# Patient Record
Sex: MALE | Race: WHITE | NOT HISPANIC OR LATINO | URBAN - METROPOLITAN AREA
[De-identification: names, ages, dates, MRNs, and addresses within clinical notes are randomized per-mention and may not be internally consistent; named-entity substitution may affect disease eponyms.]

---

## 2021-04-16 ENCOUNTER — TELEPHONE ENCOUNTER (OUTPATIENT)
Dept: URBAN - METROPOLITAN AREA CLINIC 74 | Facility: CLINIC | Age: 66
End: 2021-04-16

## 2021-04-16 ENCOUNTER — OFFICE VISIT (OUTPATIENT)
Dept: URBAN - METROPOLITAN AREA TELEHEALTH 2 | Facility: TELEHEALTH | Age: 66
End: 2021-04-16
Payer: COMMERCIAL

## 2021-04-16 DIAGNOSIS — Z12.11 ENCOUNTER FOR SCREENING FOR MALIGNANT NEOPLASM OF COLON: ICD-10-CM

## 2021-04-16 DIAGNOSIS — K21.9 GASTROESOPHAGEAL REFLUX DISEASE WITHOUT ESOPHAGITIS: ICD-10-CM

## 2021-04-16 DIAGNOSIS — Z91.89 OTHER SPECIFIED PERSONAL RISK FACTORS, NOT ELSEWHERE CLASSIFIED: ICD-10-CM

## 2021-04-16 DIAGNOSIS — Z12.12 ENCOUNTER FOR SCREENING FOR MALIGNANT NEOPLASM OF RECTUM: ICD-10-CM

## 2021-04-16 PROBLEM — 305058001: Status: ACTIVE | Noted: 2021-04-16

## 2021-04-16 PROBLEM — 281694009: Status: ACTIVE | Noted: 2021-04-16

## 2021-04-16 PROCEDURE — 99203 OFFICE O/P NEW LOW 30 MIN: CPT | Performed by: STUDENT IN AN ORGANIZED HEALTH CARE EDUCATION/TRAINING PROGRAM

## 2021-04-16 RX ORDER — TRAZODONE HYDROCHLORIDE 100 MG/1
1 TABLET AT BEDTIME TABLET, FILM COATED ORAL ONCE A DAY
Status: ACTIVE | COMMUNITY

## 2021-04-16 RX ORDER — ALPRAZOLAM 0.5 MG/1
1 TABLET TABLET ORAL TWICE A DAY
Status: ACTIVE | COMMUNITY

## 2021-04-16 NOTE — HPI-TODAY'S VISIT:
66-year-old male New to me Seen today for setting up his colonoscopy in the setting of family history of colon cancer. Patient states that his sister was diagnosed with colon cancer in her 60s.  Patient last colonoscopy was almost 7 to 8 years back. Patient overall denies any GI complaints.  Reports GERD but seems to be well controlled on daily PPI.  No dysphagia or odynophagia.  No nausea or vomiting.  No constipation or diarrhea.  No blood in the stool. No anticoagulation.

## 2021-06-24 ENCOUNTER — OFFICE VISIT (OUTPATIENT)
Dept: URBAN - METROPOLITAN AREA SURGERY CENTER 30 | Facility: SURGERY CENTER | Age: 66
End: 2021-06-24

## 2021-10-25 PROBLEM — 305058001: Status: ACTIVE | Noted: 2021-04-16

## 2021-12-16 ENCOUNTER — CLAIMS CREATED FROM THE CLAIM WINDOW (OUTPATIENT)
Dept: URBAN - METROPOLITAN AREA CLINIC 4 | Facility: CLINIC | Age: 66
End: 2021-12-16
Payer: COMMERCIAL

## 2021-12-16 ENCOUNTER — OFFICE VISIT (OUTPATIENT)
Dept: URBAN - METROPOLITAN AREA SURGERY CENTER 30 | Facility: SURGERY CENTER | Age: 66
End: 2021-12-16
Payer: COMMERCIAL

## 2021-12-16 DIAGNOSIS — D12.0 BENIGN NEOPLASM OF CECUM: ICD-10-CM

## 2021-12-16 DIAGNOSIS — D12.3 ADENOMA OF TRANSVERSE COLON: ICD-10-CM

## 2021-12-16 DIAGNOSIS — D12.0 ADENOMA OF CECUM: ICD-10-CM

## 2021-12-16 DIAGNOSIS — D12.3 BENIGN NEOPLASM OF TRANSVERSE COLON: ICD-10-CM

## 2021-12-16 DIAGNOSIS — Z80.0 BROTHER AT YOUNG AGE FAMILY HISTORY OF COLON CANCER: ICD-10-CM

## 2021-12-16 PROCEDURE — 88305 TISSUE EXAM BY PATHOLOGIST: CPT | Performed by: PATHOLOGY

## 2021-12-16 PROCEDURE — 45385 COLONOSCOPY W/LESION REMOVAL: CPT | Performed by: STUDENT IN AN ORGANIZED HEALTH CARE EDUCATION/TRAINING PROGRAM

## 2021-12-16 PROCEDURE — G8907 PT DOC NO EVENTS ON DISCHARG: HCPCS | Performed by: STUDENT IN AN ORGANIZED HEALTH CARE EDUCATION/TRAINING PROGRAM

## 2023-02-01 ENCOUNTER — WEB ENCOUNTER (OUTPATIENT)
Dept: URBAN - METROPOLITAN AREA CLINIC 74 | Facility: CLINIC | Age: 68
End: 2023-02-01

## 2023-02-01 ENCOUNTER — LAB OUTSIDE AN ENCOUNTER (OUTPATIENT)
Dept: URBAN - METROPOLITAN AREA CLINIC 74 | Facility: CLINIC | Age: 68
End: 2023-02-01

## 2023-02-01 ENCOUNTER — OFFICE VISIT (OUTPATIENT)
Dept: URBAN - METROPOLITAN AREA CLINIC 74 | Facility: CLINIC | Age: 68
End: 2023-02-01
Payer: COMMERCIAL

## 2023-02-01 VITALS
TEMPERATURE: 97.5 F | WEIGHT: 179 LBS | HEIGHT: 69 IN | SYSTOLIC BLOOD PRESSURE: 139 MMHG | BODY MASS INDEX: 26.51 KG/M2 | DIASTOLIC BLOOD PRESSURE: 74 MMHG | HEART RATE: 70 BPM

## 2023-02-01 DIAGNOSIS — K21.9 GASTROESOPHAGEAL REFLUX DISEASE WITHOUT ESOPHAGITIS: ICD-10-CM

## 2023-02-01 PROBLEM — 399432003: Status: ACTIVE | Noted: 2023-01-30

## 2023-02-01 PROBLEM — 449833002: Status: ACTIVE | Noted: 2023-01-30

## 2023-02-01 PROBLEM — 429430001: Status: ACTIVE | Noted: 2023-01-30

## 2023-02-01 PROBLEM — 249504006: Status: ACTIVE | Noted: 2023-02-01

## 2023-02-01 PROCEDURE — 99213 OFFICE O/P EST LOW 20 MIN: CPT | Performed by: INTERNAL MEDICINE

## 2023-02-01 NOTE — HPI-TODAY'S VISIT:
66-year-old male New to me Seen today for setting up his colonoscopy in the setting of family history of colon cancer. Patient states that his sister was diagnosed with colon cancer in her 60s.  Patient last colonoscopy was almost 7 to 8 years back. Patient overall denies any GI complaints.  Reports GERD but seems to be well controlled on daily PPI.  No dysphagia or odynophagia.  No nausea or vomiting.  No constipation or diarrhea.  No blood in the stool. No anticoagulation.   Today February 1, 2023 the patient returns for a follow-up visit, the patient was last seen in the office on April 16, 2021 for screening colonoscopy, gastroesophageal reflux, at the time of the visit the patient stated that the sister had colon cancer at age past 60.  The patient's gastroesophageal reflux was well controlled with PPIs but the patient requested to have an EGD, the patient was advised to follow lifestyle modification rather than have an EGD as explained by DR. Trevino.  At the time the patient denied having any dysphagia, odynophagia, there was no nausea vomiting.  The patient was having normal bowel movements with no rectal bleeding.  The patient had a colonoscopy on December 16, 2021 at that time the patient was found to have 3 sessile polyps which were removed from the hepatic flexure and cecum, the polyps measuring between 3 and 5 mm, there were removed with cold snare.  All polyps were adenomatous.  The patient had sigmoid diverticulosis and grade 2 internal hemorrhoids.  At that time the patient was advised to get a colonoscopy in 5 years.  Today the patient returns to an EGD.  The patient was seen at the urgent care and subsequently the emergency room with anterior chest pain, claimed that the pain came from the epigastric area and radiated towards the left chest.  This pain has been intermittent self-limited and possibly associated with gastroesophageal reflux.  The patient has taken PPIs for many years and is currently taking omeprazole 40 mg daily.  The patient denies having any dysphagia, odynophagia, there has been occasional nausea with no vomiting, there has been no hematemesis or melena.  No history of gallbladder disease or ulcers.  Currently the patient is having normal bowel movements.  The patient was recently started on medication for anxiety by primary care.  The patient will be scheduled to have an EGD.  Benefits potential complications and alternatives to EGD were disclosed.  The patient will follow antireflux measures and diet and will remain on omeprazole as prescribed.  The patient also complained of increased flatulence when ingesting vegetables, there is no evidence of lactose intolerance.  The patient has been instructed to follow a FODMAP diet.  The patient will return to the office after completion of testing.

## 2023-03-03 PROBLEM — 312824007: Status: ACTIVE | Noted: 2023-01-30

## 2023-03-03 PROBLEM — 428283002: Status: ACTIVE | Noted: 2023-03-03

## 2023-03-03 PROBLEM — 266435005: Status: ACTIVE | Noted: 2021-04-16

## 2023-03-21 ENCOUNTER — OFFICE VISIT (OUTPATIENT)
Dept: URBAN - METROPOLITAN AREA SURGERY CENTER 30 | Facility: SURGERY CENTER | Age: 68
End: 2023-03-21
Payer: COMMERCIAL

## 2023-03-21 ENCOUNTER — CLAIMS CREATED FROM THE CLAIM WINDOW (OUTPATIENT)
Dept: URBAN - METROPOLITAN AREA CLINIC 4 | Facility: CLINIC | Age: 68
End: 2023-03-21
Payer: COMMERCIAL

## 2023-03-21 DIAGNOSIS — K31.89 OTHER DISEASES OF STOMACH AND DUODENUM: ICD-10-CM

## 2023-03-21 DIAGNOSIS — K31.89 ACQUIRED DEFORMITY OF DUODENUM: ICD-10-CM

## 2023-03-21 DIAGNOSIS — K21.9 ACID REFLUX: ICD-10-CM

## 2023-03-21 DIAGNOSIS — K29.70 GASTRITIS, UNSPECIFIED, WITHOUT BLEEDING: ICD-10-CM

## 2023-03-21 PROCEDURE — 43239 EGD BIOPSY SINGLE/MULTIPLE: CPT | Performed by: INTERNAL MEDICINE

## 2023-03-21 PROCEDURE — 88312 SPECIAL STAINS GROUP 1: CPT | Performed by: PATHOLOGY

## 2023-03-21 PROCEDURE — G8907 PT DOC NO EVENTS ON DISCHARG: HCPCS | Performed by: INTERNAL MEDICINE

## 2023-03-21 PROCEDURE — 88305 TISSUE EXAM BY PATHOLOGIST: CPT | Performed by: PATHOLOGY

## 2023-03-23 PROBLEM — 196735001: Status: ACTIVE | Noted: 2023-03-23

## 2023-03-30 PROBLEM — 266433003: Status: ACTIVE | Noted: 2023-03-30

## 2023-04-21 ENCOUNTER — WEB ENCOUNTER (OUTPATIENT)
Dept: URBAN - METROPOLITAN AREA CLINIC 74 | Facility: CLINIC | Age: 68
End: 2023-04-21

## 2023-04-21 ENCOUNTER — OFFICE VISIT (OUTPATIENT)
Dept: URBAN - METROPOLITAN AREA CLINIC 74 | Facility: CLINIC | Age: 68
End: 2023-04-21
Payer: COMMERCIAL

## 2023-04-21 ENCOUNTER — DASHBOARD ENCOUNTERS (OUTPATIENT)
Age: 68
End: 2023-04-21

## 2023-04-21 VITALS
WEIGHT: 182.8 LBS | TEMPERATURE: 96.8 F | BODY MASS INDEX: 27.08 KG/M2 | OXYGEN SATURATION: 97 % | HEART RATE: 88 BPM | SYSTOLIC BLOOD PRESSURE: 126 MMHG | HEIGHT: 69 IN | DIASTOLIC BLOOD PRESSURE: 78 MMHG

## 2023-04-21 DIAGNOSIS — K29.30 CHRONIC SUPERFICIAL GASTRITIS WITHOUT BLEEDING: ICD-10-CM

## 2023-04-21 DIAGNOSIS — K44.9 HIATAL HERNIA: ICD-10-CM

## 2023-04-21 DIAGNOSIS — Z80.0 FAMILY HISTORY OF COLON CANCER: ICD-10-CM

## 2023-04-21 DIAGNOSIS — K21.9 GASTROESOPHAGEAL REFLUX DISEASE WITHOUT ESOPHAGITIS: ICD-10-CM

## 2023-04-21 DIAGNOSIS — K21.00 CHRONIC REFLUX ESOPHAGITIS: ICD-10-CM

## 2023-04-21 DIAGNOSIS — K31.89 REACTIVE GASTROPATHY: ICD-10-CM

## 2023-04-21 DIAGNOSIS — Z86.010 PERSONAL HISTORY OF COLONIC POLYPS: ICD-10-CM

## 2023-04-21 PROCEDURE — 99213 OFFICE O/P EST LOW 20 MIN: CPT | Performed by: PHYSICIAN ASSISTANT

## 2023-04-21 RX ORDER — OMEPRAZOLE 40 MG/1
1 CAPSULE 30 MINUTES BEFORE MORNING MEAL CAPSULE, DELAYED RELEASE ORAL ONCE A DAY
Status: ACTIVE | COMMUNITY

## 2023-04-21 RX ORDER — OMEPRAZOLE 40 MG/1: 1 CAPSULE 30 MINUTES BEFORE MORNING MEAL CAPSULE, DELAYED RELEASE ORAL ONCE A DAY

## 2023-04-21 RX ORDER — FAMOTIDINE 40 MG/1
1 TABLET AT BEDTIME TABLET, FILM COATED ORAL ONCE A DAY
Qty: 30 | Refills: 3 | OUTPATIENT
Start: 2023-04-21

## 2023-04-21 NOTE — HPI-TODAY'S VISIT:
The is 67-year-old male with past medical history as noted below known to Dr. Edwards is returning to our clinic today to discuss his recent EGD results. He is taking Omeprazole 40 mg once daily with poor response. He continues with chest pain.  -- The patient denies dyspepsia, dysphagia, odynophagia, hemoptysis, hematemesis, nausea, vomiting, regurgitation, melena, constipation, diarrhea, abdominal pain, hematochezia, fever, chills, chest pain, SOB, or any other GI complaints today. -- The patient denies ETOH, Tobacco, and Illicit drug use. -- The patient is up to date with COVID vaccine 2/2. -- Admits to take Aleve pm.    Procedures: -- EGD with by Dr. Edwardsth biopsy on 03/21/2023 noted normal examined duodenum.  Erythematous mucosa in the stomach.  Small hiatal hernia.  Normal esophagus.  Biopsy of duodenum with no significant abnormality.  Stomach with chemical and reactive gastropathy.  Proton pump inhibitor effect.  No H. pylori organisms.  No intestinal metaplasia.  Esophagus with reflux type changes.  No Solis's esophagus or eosinophilic esophagitis.  -- Colonoscopy with polypectomy on 12/16/2021 by Dr. Trevino noted three 3 to 5 mm polyps at the hepatic flexure and in the cecum, removed with a cold snare.  Resected and retrieved.  Diverticulosis in sigmoid colon.  Non-bleeding internal hemorrhoids.  Repeat colonoscopy in 5 years for surveillance.  Biopsy with tubular adenoma colon polyps.